# Patient Record
Sex: MALE | Race: WHITE | NOT HISPANIC OR LATINO | Employment: FULL TIME | URBAN - METROPOLITAN AREA
[De-identification: names, ages, dates, MRNs, and addresses within clinical notes are randomized per-mention and may not be internally consistent; named-entity substitution may affect disease eponyms.]

---

## 2021-06-07 ENCOUNTER — APPOINTMENT (OUTPATIENT)
Dept: RADIOLOGY | Facility: MEDICAL CENTER | Age: 31
End: 2021-06-07
Attending: EMERGENCY MEDICINE
Payer: COMMERCIAL

## 2021-06-07 ENCOUNTER — HOSPITAL ENCOUNTER (EMERGENCY)
Facility: MEDICAL CENTER | Age: 31
End: 2021-06-07
Attending: EMERGENCY MEDICINE
Payer: COMMERCIAL

## 2021-06-07 VITALS
RESPIRATION RATE: 18 BRPM | WEIGHT: 172.4 LBS | HEART RATE: 95 BPM | HEIGHT: 66 IN | SYSTOLIC BLOOD PRESSURE: 124 MMHG | TEMPERATURE: 97.4 F | OXYGEN SATURATION: 99 % | DIASTOLIC BLOOD PRESSURE: 82 MMHG | BODY MASS INDEX: 27.71 KG/M2

## 2021-06-07 DIAGNOSIS — G43.009 ATYPICAL MIGRAINE: ICD-10-CM

## 2021-06-07 LAB
ABO GROUP BLD: NORMAL
ALBUMIN SERPL BCP-MCNC: 5.2 G/DL (ref 3.2–4.9)
ALBUMIN/GLOB SERPL: 1.9 G/DL
ALP SERPL-CCNC: 86 U/L (ref 30–99)
ALT SERPL-CCNC: 56 U/L (ref 2–50)
ANION GAP SERPL CALC-SCNC: 13 MMOL/L (ref 7–16)
APTT PPP: 26.6 SEC (ref 24.7–36)
AST SERPL-CCNC: 37 U/L (ref 12–45)
BASOPHILS # BLD AUTO: 0.2 % (ref 0–1.8)
BASOPHILS # BLD: 0.03 K/UL (ref 0–0.12)
BILIRUB SERPL-MCNC: 1 MG/DL (ref 0.1–1.5)
BLD GP AB SCN SERPL QL: NORMAL
BUN SERPL-MCNC: 11 MG/DL (ref 8–22)
CALCIUM SERPL-MCNC: 10.2 MG/DL (ref 8.5–10.5)
CHLORIDE SERPL-SCNC: 100 MMOL/L (ref 96–112)
CO2 SERPL-SCNC: 27 MMOL/L (ref 20–33)
CREAT SERPL-MCNC: 0.95 MG/DL (ref 0.5–1.4)
EKG IMPRESSION: NORMAL
EOSINOPHIL # BLD AUTO: 0.01 K/UL (ref 0–0.51)
EOSINOPHIL NFR BLD: 0.1 % (ref 0–6.9)
ERYTHROCYTE [DISTWIDTH] IN BLOOD BY AUTOMATED COUNT: 38.6 FL (ref 35.9–50)
GLOBULIN SER CALC-MCNC: 2.7 G/DL (ref 1.9–3.5)
GLUCOSE SERPL-MCNC: 100 MG/DL (ref 65–99)
HCT VFR BLD AUTO: 51.7 % (ref 42–52)
HGB BLD-MCNC: 18.2 G/DL (ref 14–18)
IMM GRANULOCYTES # BLD AUTO: 0.08 K/UL (ref 0–0.11)
IMM GRANULOCYTES NFR BLD AUTO: 0.5 % (ref 0–0.9)
INR PPP: 1.01 (ref 0.87–1.13)
LYMPHOCYTES # BLD AUTO: 1.41 K/UL (ref 1–4.8)
LYMPHOCYTES NFR BLD: 9.5 % (ref 22–41)
MCH RBC QN AUTO: 30.8 PG (ref 27–33)
MCHC RBC AUTO-ENTMCNC: 35.2 G/DL (ref 33.7–35.3)
MCV RBC AUTO: 87.6 FL (ref 81.4–97.8)
MONOCYTES # BLD AUTO: 0.41 K/UL (ref 0–0.85)
MONOCYTES NFR BLD AUTO: 2.8 % (ref 0–13.4)
NEUTROPHILS # BLD AUTO: 12.88 K/UL (ref 1.82–7.42)
NEUTROPHILS NFR BLD: 86.9 % (ref 44–72)
NRBC # BLD AUTO: 0 K/UL
NRBC BLD-RTO: 0 /100 WBC
PLATELET # BLD AUTO: 218 K/UL (ref 164–446)
PMV BLD AUTO: 11.2 FL (ref 9–12.9)
POTASSIUM SERPL-SCNC: 3.7 MMOL/L (ref 3.6–5.5)
PROT SERPL-MCNC: 7.9 G/DL (ref 6–8.2)
PROTHROMBIN TIME: 13.6 SEC (ref 12–14.6)
RBC # BLD AUTO: 5.9 M/UL (ref 4.7–6.1)
RH BLD: NORMAL
SODIUM SERPL-SCNC: 140 MMOL/L (ref 135–145)
TROPONIN T SERPL-MCNC: <6 NG/L (ref 6–19)
WBC # BLD AUTO: 14.8 K/UL (ref 4.8–10.8)

## 2021-06-07 PROCEDURE — 80053 COMPREHEN METABOLIC PANEL: CPT

## 2021-06-07 PROCEDURE — 99283 EMERGENCY DEPT VISIT LOW MDM: CPT | Performed by: PSYCHIATRY & NEUROLOGY

## 2021-06-07 PROCEDURE — 85025 COMPLETE CBC W/AUTO DIFF WBC: CPT

## 2021-06-07 PROCEDURE — 93005 ELECTROCARDIOGRAM TRACING: CPT | Performed by: EMERGENCY MEDICINE

## 2021-06-07 PROCEDURE — 99284 EMERGENCY DEPT VISIT MOD MDM: CPT

## 2021-06-07 PROCEDURE — 85730 THROMBOPLASTIN TIME PARTIAL: CPT

## 2021-06-07 PROCEDURE — 86850 RBC ANTIBODY SCREEN: CPT

## 2021-06-07 PROCEDURE — 70498 CT ANGIOGRAPHY NECK: CPT

## 2021-06-07 PROCEDURE — 71045 X-RAY EXAM CHEST 1 VIEW: CPT

## 2021-06-07 PROCEDURE — 85610 PROTHROMBIN TIME: CPT

## 2021-06-07 PROCEDURE — 86900 BLOOD TYPING SEROLOGIC ABO: CPT

## 2021-06-07 PROCEDURE — 70496 CT ANGIOGRAPHY HEAD: CPT

## 2021-06-07 PROCEDURE — 96375 TX/PRO/DX INJ NEW DRUG ADDON: CPT

## 2021-06-07 PROCEDURE — 0042T CT-CEREBRAL PERFUSION ANALYSIS: CPT

## 2021-06-07 PROCEDURE — 86901 BLOOD TYPING SEROLOGIC RH(D): CPT

## 2021-06-07 PROCEDURE — 700111 HCHG RX REV CODE 636 W/ 250 OVERRIDE (IP): Performed by: EMERGENCY MEDICINE

## 2021-06-07 PROCEDURE — 700117 HCHG RX CONTRAST REV CODE 255: Performed by: EMERGENCY MEDICINE

## 2021-06-07 PROCEDURE — 84484 ASSAY OF TROPONIN QUANT: CPT

## 2021-06-07 PROCEDURE — 96374 THER/PROPH/DIAG INJ IV PUSH: CPT

## 2021-06-07 RX ORDER — ONDANSETRON 2 MG/ML
4 INJECTION INTRAMUSCULAR; INTRAVENOUS ONCE
Status: COMPLETED | OUTPATIENT
Start: 2021-06-07 | End: 2021-06-07

## 2021-06-07 RX ORDER — MORPHINE SULFATE 4 MG/ML
4 INJECTION, SOLUTION INTRAMUSCULAR; INTRAVENOUS ONCE
Status: COMPLETED | OUTPATIENT
Start: 2021-06-07 | End: 2021-06-07

## 2021-06-07 RX ADMIN — MORPHINE SULFATE 4 MG: 4 INJECTION INTRAVENOUS at 21:30

## 2021-06-07 RX ADMIN — ONDANSETRON 4 MG: 2 INJECTION INTRAMUSCULAR; INTRAVENOUS at 21:30

## 2021-06-07 RX ADMIN — IOHEXOL 70 ML: 350 INJECTION, SOLUTION INTRAVENOUS at 21:39

## 2021-06-07 RX ADMIN — IOHEXOL 40 ML: 350 INJECTION, SOLUTION INTRAVENOUS at 21:38

## 2021-06-07 ASSESSMENT — LIFESTYLE VARIABLES
HAVE YOU EVER FELT YOU SHOULD CUT DOWN ON YOUR DRINKING: NO
EVER FELT BAD OR GUILTY ABOUT YOUR DRINKING: NO
TOTAL SCORE: 0
ON A TYPICAL DAY WHEN YOU DRINK ALCOHOL HOW MANY DRINKS DO YOU HAVE: 0
HAVE PEOPLE ANNOYED YOU BY CRITICIZING YOUR DRINKING: NO
DO YOU DRINK ALCOHOL: NO
CONSUMPTION TOTAL: NEGATIVE
EVER HAD A DRINK FIRST THING IN THE MORNING TO STEADY YOUR NERVES TO GET RID OF A HANGOVER: NO
TOTAL SCORE: 0
HOW MANY TIMES IN THE PAST YEAR HAVE YOU HAD 5 OR MORE DRINKS IN A DAY: 0
TOTAL SCORE: 0
AVERAGE NUMBER OF DAYS PER WEEK YOU HAVE A DRINK CONTAINING ALCOHOL: 0

## 2021-06-07 ASSESSMENT — PAIN DESCRIPTION - PAIN TYPE: TYPE: ACUTE PAIN

## 2021-06-07 NOTE — ED TRIAGE NOTES
Maciel Swanwitz  31 y.o. male  Chief Complaint   Patient presents with   • Headache     Patient reports around 1600 he developed a sudden onset of headache with impaired vision to left eye described as foggy that last for 30 minutes. Patient states he then vomited and developed intermittent numbness tingling to left side mouth and left arm, which has now resolved. Patient reports history of migraines, reports similar episodes in the past. Patient took 500 mg tylenol with no relief.     Vitals:    06/07/21 1643   BP: 138/57   Pulse: 85   Resp: 16   Temp: 36.4 °C (97.5 °F)   SpO2: 98%

## 2021-06-08 NOTE — ED NOTES
Discharge teaching and paperwork provided regarding migraines and follow-up appointment  and all questions/concerns answered. VSS, pain assessment stable and PIV removed. Patient discharged to the care of self and ambulated out of the ED.

## 2021-06-08 NOTE — CONSULTS
"Neurology Initial Consult H&P  Neurohospitalist Service, [facility]    Referring Physician: Josie Metz M.D.    Chief Complaint   Patient presents with   • Headache       HPI: 31-year-old male history of migraines.  Usually has a migraine 1-2 times a year.  Typical migraines pain located on the right side.  He has had left-sided visual changes with his migraines before the past.  Also left-sided numbness in his face and hand.  This afternoon he started getting left-sided visual loss.  Followed by left arm and leg numbness.  This is followed by moderate intensity headache over the right periorbital region.  This lasted roughly 4 to 5 hours.  It is now resolved after morphine in the ER.  No more numbness.  No vision loss.  Headache has resolved.  He has never had the numbness vision loss this severe before.  Usually headaches much more severe though. CTA head and neck was unremarkable in ER.        Review of systems: In addition to what is detailed in the HPI above, (and scanned into the chart if and when applicable), all other systems reviewed and are negative.    Past Medical History:   Migraines  FHx:  Mom has migraines  SHx:   reports that he has never smoked. He has never used smokeless tobacco. He reports current drug use. Drug: Inhaled. He reports that he does not drink alcohol.    Allergies:  No Known Allergies    Medications:  No current facility-administered medications for this encounter.  No current outpatient medications on file.    Physical Examination:     Vitals:    06/07/21 1643 06/07/21 1647 06/07/21 2023   BP: 138/57  115/67   Pulse: 85  71   Resp: 16  16   Temp: 36.4 °C (97.5 °F)     TempSrc: Temporal     SpO2: 98%  100%   Weight:  78.2 kg (172 lb 6.4 oz)    Height: 1.676 m (5' 6\")         General: Patient is awake and in no acute distress  Eyes: Normal disc  CV: RRR    NEUROLOGICAL EXAM:     Mental status: Awake, alert and fully oriented, follows commands  Speech and " language: speech is clear and fluent. The patient is able to name and repeat.  Cranial nerve exam: Pupils are equal, round and reactive to light bilaterally. Visual fields are full. Extraocular muscles are intact. Sensation in the face is intact to light touch. Face is symmetric. Hearing to finger rub equal. Palate elevates symmetrically. Shoulder shrug is full. Tongue is midline.  Motor exam: Strength is 5/5 in all extremities both distally and proximally. Tone is normal. No abnormal movements were seen on exam.  Sensory exam: No sensory deficits identified   Deep tendon reflexes:  2+ and symmetric. Toes down-going bilaterally.  Coordination: no ataxia   Gait: Normal    Objective Data:    Labs:  Lab Results   Component Value Date/Time    PROTHROMBTM 13.6 06/07/2021 09:07 PM    INR 1.01 06/07/2021 09:07 PM      Lab Results   Component Value Date/Time    WBC 14.8 (H) 06/07/2021 09:07 PM    RBC 5.90 06/07/2021 09:07 PM    HEMOGLOBIN 18.2 (H) 06/07/2021 09:07 PM    HEMATOCRIT 51.7 06/07/2021 09:07 PM    MCV 87.6 06/07/2021 09:07 PM    MCH 30.8 06/07/2021 09:07 PM    MCHC 35.2 06/07/2021 09:07 PM    MPV 11.2 06/07/2021 09:07 PM    NEUTSPOLYS 86.90 (H) 06/07/2021 09:07 PM    LYMPHOCYTES 9.50 (L) 06/07/2021 09:07 PM    MONOCYTES 2.80 06/07/2021 09:07 PM    EOSINOPHILS 0.10 06/07/2021 09:07 PM    BASOPHILS 0.20 06/07/2021 09:07 PM      Lab Results   Component Value Date/Time    SODIUM 140 06/07/2021 09:07 PM    POTASSIUM 3.7 06/07/2021 09:07 PM    CHLORIDE 100 06/07/2021 09:07 PM    CO2 27 06/07/2021 09:07 PM    GLUCOSE 100 (H) 06/07/2021 09:07 PM    BUN 11 06/07/2021 09:07 PM    CREATININE 0.95 06/07/2021 09:07 PM      No results found for: CHOLSTRLTOT, LDL, HDL, TRIGLYCERIDE    Lab Results   Component Value Date/Time    ALKPHOSPHAT 86 06/07/2021 09:07 PM    ASTSGOT 37 06/07/2021 09:07 PM    ALTSGPT 56 (H) 06/07/2021 09:07 PM    TBILIRUBIN 1.0 06/07/2021 09:07 PM        Imaging/Testing:  DX-CHEST-PORTABLE (1 VIEW)   Final  Result      No acute cardiac or pulmonary abnormalities are identified.      CT-CTA NECK WITH & W/O-POST PROCESSING   Final Result      CT angiogram of the neck within normal limits.      CT-CTA HEAD WITH & W/O-POST PROCESS   Final Result      CT angiogram of the [place] within normal limits.      CT-CEREBRAL PERFUSION ANALYSIS   Final Result      1.  Cerebral blood flow less than 30% likely representing completed infarct = 0 mL.      2.  T Max more than 6 seconds likely representing combination of completed infarct and ischemia = 0 mL.      3.  Mismatched volume likely representing ischemic brain/penumbra = None      4.  Please note that the cerebral perfusion was performed on the limited brain tissue around the basal ganglia region. Infarct/ischemia outside the CT perfusion sections can be missed in this study.            Assessment and Plan:    31-year-old male resenting with a headache accompanied by left sided visual loss and numbness.  Now resolved.  He has had these type of issues before with his migraines.  Never this severe before. CTA imaging is reassuring.  I believe this is complex migraine.  Patient wants to go home.  I am in agreement with plan to go home.  No treatment necessary at this time.  He says he only gets a migraine once or twice a year.  Can follow-up with his PCP.  Could consider Nurtec as needed for migraines.    The evaluation of the patient, and recommended management, was discussed with the resident staff.       This chart was partially generated using voice recognition technology and sound alike word replacement may be present, best efforts were made to make the chart accurate.    Jarek Lam MD  Board Certified Neurology, ABPN  t) [phone]

## 2021-06-08 NOTE — ED PROVIDER NOTES
ED Provider Note    Scribed for Josie Metz M.D. by Jarek Young. 6/7/2021  8:48 PM    Primary care provider: None noted  Means of arrival: walk in  History obtained from: patient  History limited by: none    CHIEF COMPLAINT  Chief Complaint   Patient presents with    Headache       HPI  Maciel Mao is a 31 y.o. male with a history of migraine who presents to the Emergency Department for sudden loss of vision in his left eye 5-6 hours prior to arrival. He states that 30 minutes after symptom onset, then developed intermittent numbness in his left arm, left leg, and left side of his mouth, which has now mostly resolved. He also reports associated left-sided weakness and headache, which he notes is not as bad as other migraines he's had. He notes he has been having neck pain for the past 2-3 weeks, but denies any recent neck or head trauma. He denies any chest pain. He took 500 mg of Tylenol earlier today with no alleviation. The patient is from [place] and in town to view the site for his wedding. He states he does not smoke, drink, or use drugs.    REVIEW OF SYSTEMS  HENT: No recent head trauma  EYES: Positive vision loss in left eye  CARDIAC: no chest pain  GI: no vomiting  Neuro: Positive headache, numbness in left extremities and left side of mouth, left sided weakness.    See history of present illness. All other systems are negative. C.    PAST MEDICAL HISTORY       SURGICAL HISTORY  patient denies any surgical history    SOCIAL HISTORY  Social History     Tobacco Use    Smoking status: Never Smoker    Smokeless tobacco: Never Used   Substance Use Topics    Alcohol use: Never    Drug use: Yes     Types: Inhaled     Comment: marijuana      Social History     Substance and Sexual Activity   Drug Use Yes    Types: Inhaled    Comment: marijuana       FAMILY HISTORY  History reviewed. No pertinent family history.    CURRENT MEDICATIONS  Home Medications    **Home medications have not yet been  "reviewed for this encounter**         ALLERGIES  No Known Allergies    PHYSICAL EXAM  VITAL SIGNS: /67   Pulse 71   Temp 36.4 °C (97.5 °F) (Temporal)   Resp 16   Ht 1.676 m (5' 6\")   Wt 78.2 kg (172 lb 6.4 oz)   SpO2 100%   BMI 27.83 kg/m²     Constitutional: Well developed, Well nourished, No acute distress, Non-toxic appearance.   HEENT: Normocephalic, Atraumatic,  external ears normal, pharynx pink,  Mucous  Membranes moist, No rhinorrhea or mucosal edema  Eyes: PERRL, EOMI, Conjunctiva normal, No discharge.   Neck: Normal range of motion, No tenderness, Supple, No stridor.   Lymphatic: No lymphadenopathy    Cardiovascular: Regular Rate and Rhythm, No murmurs,  rubs, or gallops.   Thorax & Lungs: Lungs clear to auscultation bilaterally, No respiratory distress, No wheezes, rhales or rhonchi, No chest wall tenderness.   Abdomen: Bowel sounds normal, Soft, non tender, non distended,  No pulsatile masses., no rebound guarding or peritoneal signs.   Skin: Warm, Dry, No erythema, No rash,   Back:  No CVA tenderness,  No spinal tenderness, bony crepitance, step offs, or instability.   Neurologic: Alert & oriented clear speech. NIH score = 3, Left arm and left leg numbness, Tongue deviation to right  Extremities: Equal, intact distal pulses, No cyanosis, clubbing or edema,  No tenderness.   Musculoskeletal: Good range of motion in all major joints. No tenderness to palpation or major deformities noted.     DIAGNOSTIC STUDIES / PROCEDURES    LABS  Results for orders placed or performed during the hospital encounter of 06/07/21   CBC WITH DIFFERENTIAL   Result Value Ref Range    WBC 14.8 (H) 4.8 - 10.8 K/uL    RBC 5.90 4.70 - 6.10 M/uL    Hemoglobin 18.2 (H) 14.0 - 18.0 g/dL    Hematocrit 51.7 42.0 - 52.0 %    MCV 87.6 81.4 - 97.8 fL    MCH 30.8 27.0 - 33.0 pg    MCHC 35.2 33.7 - 35.3 g/dL    RDW 38.6 35.9 - 50.0 fL    Platelet Count 218 164 - 446 K/uL    MPV 11.2 9.0 - 12.9 fL    Neutrophils-Polys 86.90 (H) " 44.00 - 72.00 %    Lymphocytes 9.50 (L) 22.00 - 41.00 %    Monocytes 2.80 0.00 - 13.40 %    Eosinophils 0.10 0.00 - 6.90 %    Basophils 0.20 0.00 - 1.80 %    Immature Granulocytes 0.50 0.00 - 0.90 %    Nucleated RBC 0.00 /100 WBC    Neutrophils (Absolute) 12.88 (H) 1.82 - 7.42 K/uL    Lymphs (Absolute) 1.41 1.00 - 4.80 K/uL    Monos (Absolute) 0.41 0.00 - 0.85 K/uL    Eos (Absolute) 0.01 0.00 - 0.51 K/uL    Baso (Absolute) 0.03 0.00 - 0.12 K/uL    Immature Granulocytes (abs) 0.08 0.00 - 0.11 K/uL    NRBC (Absolute) 0.00 K/uL   COMP METABOLIC PANEL   Result Value Ref Range    Sodium 140 135 - 145 mmol/L    Potassium 3.7 3.6 - 5.5 mmol/L    Chloride 100 96 - 112 mmol/L    Co2 27 20 - 33 mmol/L    Anion Gap 13.0 7.0 - 16.0    Glucose 100 (H) 65 - 99 mg/dL    Bun 11 8 - 22 mg/dL    Creatinine 0.95 0.50 - 1.40 mg/dL    Calcium 10.2 8.5 - 10.5 mg/dL    AST(SGOT) 37 12 - 45 U/L    ALT(SGPT) 56 (H) 2 - 50 U/L    Alkaline Phosphatase 86 30 - 99 U/L    Total Bilirubin 1.0 0.1 - 1.5 mg/dL    Albumin 5.2 (H) 3.2 - 4.9 g/dL    Total Protein 7.9 6.0 - 8.2 g/dL    Globulin 2.7 1.9 - 3.5 g/dL    A-G Ratio 1.9 g/dL   PROTHROMBIN TIME   Result Value Ref Range    PT 13.6 12.0 - 14.6 sec    INR 1.01 0.87 - 1.13   APTT   Result Value Ref Range    APTT 26.6 24.7 - 36.0 sec   COD (ADULT)   Result Value Ref Range    ABO Grouping Only B     Rh Grouping Only POS     Antibody Screen-Cod NEG    TROPONIN   Result Value Ref Range    Troponin T <6 6 - 19 ng/L   ESTIMATED GFR   Result Value Ref Range    GFR If African American >60 >60 mL/min/1.73 m 2    GFR If Non African American >60 >60 mL/min/1.73 m 2   EKG (NOW)   Result Value Ref Range    Report       [facility] Emergency Dept.    Test Date:  2021  Pt Name:    CHEPE LINDSEY              Department: ER  MRN:        [id number]                      Room:       [facility]  Gender:     Male                         Technician: [id number]  :        1990                    Requested By:RUDY LOW  Order #:    [id number]                    Reading MD: RUDY LOW MD    Measurements  Intervals                                Axis  Rate:       84                           P:          55  AR:         106                          QRS:        -13  QRSD:       103                          T:          35  QT:         378  QTc:        447    Interpretive Statements  Sinus rhythm  Short AR interval  No previous ECG available for comparison  Electronically Signed On 6-7-2021 22:23:48 PDT by RUDY LOW MD         All labs reviewed by me.    EKG  12 lead EKG; Interpreted by me above.    RADIOLOGY  DX-CHEST-PORTABLE (1 VIEW)   Final Result      No acute cardiac or pulmonary abnormalities are identified.      CT-CTA NECK WITH & W/O-POST PROCESSING   Final Result      CT angiogram of the neck within normal limits.      CT-CTA HEAD WITH & W/O-POST PROCESS   Final Result      CT angiogram of the [place] within normal limits.      CT-CEREBRAL PERFUSION ANALYSIS   Final Result      1.  Cerebral blood flow less than 30% likely representing completed infarct = 0 mL.      2.  T Max more than 6 seconds likely representing combination of completed infarct and ischemia = 0 mL.      3.  Mismatched volume likely representing ischemic brain/penumbra = None      4.  Please note that the cerebral perfusion was performed on the limited brain tissue around the basal ganglia region. Infarct/ischemia outside the CT perfusion sections can be missed in this study.        The radiologist's interpretation of all radiological studies have been reviewed by me.    COURSE & MEDICAL DECISION MAKING  Nursing notes, VS, PMSFHx reviewed in chart.    8:48 PM Patient seen and examined at bedside. Patient will be treated with morphine 4 mg/mL 4 mg, Zofran 4 mg. Ordered DX chest, CT head, CT cerbral perfusion analysis, CT CTA head w/ and w/o post process, CT CTA neck w/ and w/o post process, CBC w/, CMP, prothrombin  time, APTT, COD adult, troponin for full stroke workup to evaluate his symptoms. The differential diagnoses include but are not limited to: Atypical migraine vs cerebrovascular accident, vs seizure, brain tumor, aneurysm    9:57 PM Spoke with Dr. Lam, Neurology, about the patient's condition. He agreed to consult at bedside.    10:23 PM - Patient seen at bedside. He is currently asymptomatic and reports no headache at this time. Discussed lab and imaging results with the patient and updated them on the plan of care, including discharge and follow up with his PCP in [place]. I answered all questions regarding their care. Patient verbalizes understanding and agreement to this plan of care.     Patient has had high blood pressure while in the emergency department, felt likely secondary to medical condition. Counseled patient to monitor blood pressure at home and follow up with primary care physician.    The patient will return for new or worsening symptoms and is stable at the time of discharge.    The patient is referred to a primary physician for blood pressure management, diabetic screening, and for all other preventative health concerns.      DISPOSITION:  Patient will be discharged home in stable condition.    FOLLOW UP:  your primary care in [place]    Call in 2 days  for recheck      OUTPATIENT MEDICATIONS:  There are no discharge medications for this patient.        FINAL IMPRESSION  1. Atypical migraine          Jarek GENAO (Tushar), am scribing for, and in the presence of, Josie Metz M.D..    Electronically signed by: Jarek Young (Tushar), 6/7/2021    Josie GENAO M.D. personally performed the services described in this documentation, as scribed by Jarek Young in my presence, and it is both accurate and complete.    The note accurately reflects work and decisions made by me.  Josie Metz M.D.  6/7/2021  10:42 PM